# Patient Record
Sex: FEMALE | Race: WHITE | HISPANIC OR LATINO | Employment: UNEMPLOYED
[De-identification: names, ages, dates, MRNs, and addresses within clinical notes are randomized per-mention and may not be internally consistent; named-entity substitution may affect disease eponyms.]

---

## 2019-02-26 ENCOUNTER — HOSPITAL (OUTPATIENT)
Dept: OTHER | Age: 49
End: 2019-02-26
Attending: NURSE PRACTITIONER

## 2019-02-26 LAB
CONTROL LINE: PRESENT
Lab: CLEAR
UA APPEAR POC: CLEAR
UA BILI POC: NEGATIVE
UA BLOOD POC: ABNORMAL
UA COLOR POC: YELLOW
UA GLUCOSE POC: NEGATIVE
UA KETONES POC: NEGATIVE
UA LEUK EST POC: NEGATIVE
UA NITRITE POC: NEGATIVE
UA PH POC: 6 (ref 5–7)
UA PROTEIN POC: NEGATIVE
UA SPEC GRAV POC: 1.02 (ref 1.01–1.02)
UA UROBILIN POC: 0.2 MG/DL
URINE PREG POC: NEGATIVE

## 2019-04-06 ENCOUNTER — HOSPITAL (OUTPATIENT)
Dept: OTHER | Age: 49
End: 2019-04-06
Attending: PHYSICIAN ASSISTANT

## 2019-06-29 ENCOUNTER — HOSPITAL (OUTPATIENT)
Dept: OTHER | Age: 49
End: 2019-06-29
Attending: INTERNAL MEDICINE

## 2019-06-29 PROCEDURE — 93010 ELECTROCARDIOGRAM REPORT: CPT | Performed by: INTERNAL MEDICINE

## 2019-07-17 ENCOUNTER — HOSPITAL (OUTPATIENT)
Dept: OTHER | Age: 49
End: 2019-07-17
Attending: NURSE PRACTITIONER

## 2020-06-11 ENCOUNTER — HOSPITAL (OUTPATIENT)
Dept: OTHER | Age: 50
End: 2020-06-11
Attending: NURSE PRACTITIONER

## 2020-06-11 LAB
CONTROL LINE: PRESENT
Lab: CLEAR
UA APPEAR POC: ABNORMAL
UA APPEAR POC: ABNORMAL
UA BILI POC: NEGATIVE
UA BILI POC: NEGATIVE
UA BLOOD POC: ABNORMAL
UA BLOOD POC: ABNORMAL
UA COLOR POC: YELLOW
UA COLOR POC: YELLOW
UA GLUCOSE POC: NEGATIVE
UA GLUCOSE POC: NEGATIVE
UA KETONES POC: NEGATIVE
UA KETONES POC: NEGATIVE
UA LEUK EST POC: NEGATIVE
UA LEUK EST POC: NEGATIVE
UA NITRITE POC: NEGATIVE
UA NITRITE POC: NEGATIVE
UA PH POC: 6.5 (ref 5–7)
UA PH POC: 6.5 (ref 5–7)
UA PROTEIN POC: NEGATIVE
UA PROTEIN POC: NEGATIVE
UA SPEC GRAV POC: 1.01 (ref 1.01–1.02)
UA SPEC GRAV POC: 1.01 (ref 1.01–1.02)
UA UROBILIN POC: 0.2 MG/DL
UA UROBILIN POC: 0.2 MG/DL
URINE PREG POC: NEGATIVE
URINE PREG POC: NEGATIVE

## 2020-08-22 ENCOUNTER — HOSPITAL (OUTPATIENT)
Dept: OTHER | Age: 50
End: 2020-08-22
Attending: PHYSICIAN ASSISTANT

## 2020-08-22 LAB
A/G RATIO_: 1.2
A/G RATIO_: 1.2
ABS LYMPH: 1.6 K/CUMM (ref 1–3.5)
ABS LYMPH: 1.6 K/CUMM (ref 1–3.5)
ABS MONO: 0.7 K/CUMM (ref 0.1–0.8)
ABS MONO: 0.7 K/CUMM (ref 0.1–0.8)
ABS NEUTRO: 8.6 K/CUMM (ref 2–8)
ABS NEUTRO: 8.6 K/CUMM (ref 2–8)
ALBUMIN: 4.3 G/DL (ref 3.5–5)
ALBUMIN: 4.3 G/DL (ref 3.5–5)
ALK PHOS: 103 UNIT/L (ref 50–124)
ALK PHOS: 103 UNIT/L (ref 50–124)
ALT/GPT: 39 UNIT/L (ref 0–55)
ALT/GPT: 39 UNIT/L (ref 0–55)
ANION GAP SERPL CALC-SCNC: 17 MEQ/L (ref 10–20)
ANION GAP SERPL CALC-SCNC: 17 MEQ/L (ref 10–20)
AST/GOT: 44 UNIT/L (ref 5–34)
AST/GOT: 44 UNIT/L (ref 5–34)
BASOPHIL: 0 % (ref 0–1)
BASOPHIL: 0 % (ref 0–1)
BILI TOTAL: 0.2 MG/DL (ref 0.2–1)
BILI TOTAL: 0.2 MG/DL (ref 0.2–1)
BUN SERPL-MCNC: 10 MG/DL (ref 6–20)
BUN SERPL-MCNC: 10 MG/DL (ref 6–20)
CALCIUM: 9.5 MG/DL (ref 8.4–10.2)
CALCIUM: 9.5 MG/DL (ref 8.4–10.2)
CHLORIDE: 103 MEQ/L (ref 97–107)
CHLORIDE: 103 MEQ/L (ref 97–107)
CONTROL LINE: PRESENT
CONTROL LINE: PRESENT
CREATININE: 0.77 MG/DL (ref 0.6–1.3)
CREATININE: 0.77 MG/DL (ref 0.6–1.3)
DIFF_TYPE?: ABNORMAL
EOSINOPHIL: 1 % (ref 0–6)
EOSINOPHIL: 1 % (ref 0–6)
GLOBULIN_: 3.6 G/DL (ref 2–4.1)
GLOBULIN_: 3.6 G/DL (ref 2–4.1)
GLUCOSE LVL: 95 MG/DL (ref 70–99)
GLUCOSE LVL: 95 MG/DL (ref 70–99)
HCT VFR BLD CALC: 43 % (ref 33–45)
HCT VFR BLD CALC: 43 % (ref 33–45)
HEMOLYSIS 2+: NEGATIVE
HGB BLD-MCNC: 14.2 G/DL (ref 11–15)
HGB BLD-MCNC: 14.2 G/DL (ref 11–15)
ICTERIC 4+: NEGATIVE
IMMATURE GRAN: 0.4 % (ref 0–0.3)
IMMATURE GRAN: 0.4 % (ref 0–0.3)
INSTR WBC: 11.3 K/CUMM (ref 4–11)
LIPASE LEVEL: 368 UNIT/L (ref 8–78)
LIPASE LEVEL: 368 UNIT/L (ref 8–78)
LIPEMIC 3+: NEGATIVE
LYMPHOCYTE: 15 %
LYMPHOCYTE: 15 %
Lab: CLEAR
Lab: CLEAR
MCH RBC QN AUTO: 31 PG (ref 25–35)
MCH RBC QN AUTO: 31 PG (ref 25–35)
MCHC RBC AUTO-ENTMCNC: 33 G/DL (ref 32–37)
MCHC RBC AUTO-ENTMCNC: 33 G/DL (ref 32–37)
MCV RBC AUTO: 94 FL (ref 78–97)
MCV RBC AUTO: 94 FL (ref 78–97)
MONOCYTE: 6 %
MONOCYTE: 6 %
NEUTROPHIL: 77 %
NEUTROPHIL: 77 %
NRBC BLD MANUAL-RTO: 0 % (ref 0–0.2)
NRBC BLD MANUAL-RTO: 0 % (ref 0–0.2)
PLATELET: 302 K/CUMM (ref 150–450)
PLATELET: 302 K/CUMM (ref 150–450)
POTASSIUM: 4 MEQ/L (ref 3.5–5.1)
POTASSIUM: 4 MEQ/L (ref 3.5–5.1)
RBC # BLD: 4.63 M/CUMM (ref 3.7–5.2)
RBC # BLD: 4.63 M/CUMM (ref 3.7–5.2)
RDW: 12.1 % (ref 11.5–14.5)
RDW: 12.1 % (ref 11.5–14.5)
SODIUM: 139 MEQ/L (ref 136–145)
SODIUM: 139 MEQ/L (ref 136–145)
TCO2: 23 MEQ/L (ref 19–29)
TCO2: 23 MEQ/L (ref 19–29)
TOTAL PROTEIN: 7.9 G/DL (ref 6.4–8.3)
TOTAL PROTEIN: 7.9 G/DL (ref 6.4–8.3)
UA APPEAR POC: ABNORMAL
UA APPEAR POC: ABNORMAL
UA APPEAR: CLEAR
UA APPEAR: CLEAR
UA BACTERIA: ABNORMAL
UA BACTERIA: ABNORMAL
UA BILI POC: NEGATIVE
UA BILI POC: NEGATIVE
UA BILI: NEGATIVE
UA BILI: NEGATIVE
UA BLOOD POC: ABNORMAL
UA BLOOD POC: ABNORMAL
UA BLOOD: ABNORMAL
UA BLOOD: ABNORMAL
UA COLOR POC: YELLOW
UA COLOR POC: YELLOW
UA COLOR: YELLOW
UA COLOR: YELLOW
UA EPITHELIAL: ABNORMAL
UA EPITHELIAL: ABNORMAL
UA GLUCOSE POC: NEGATIVE
UA GLUCOSE POC: NEGATIVE
UA GLUCOSE: NEGATIVE
UA GLUCOSE: NEGATIVE
UA KETONES POC: NEGATIVE
UA KETONES POC: NEGATIVE
UA KETONES: NEGATIVE
UA KETONES: NEGATIVE
UA LEUK EST POC: ABNORMAL
UA LEUK EST POC: ABNORMAL
UA LEUK EST: ABNORMAL
UA LEUK EST: ABNORMAL
UA NITRITE POC: NEGATIVE
UA NITRITE POC: NEGATIVE
UA NITRITE: NEGATIVE
UA NITRITE: NEGATIVE
UA PH POC: 6 (ref 5–7)
UA PH POC: 6 (ref 5–7)
UA PH: 6.5 (ref 5–7)
UA PH: 6.5 (ref 5–7)
UA PROTEIN POC: NEGATIVE
UA PROTEIN POC: NEGATIVE
UA PROTEIN: NEGATIVE
UA PROTEIN: NEGATIVE
UA RBC: ABNORMAL
UA RBC: ABNORMAL
UA SPEC GRAV POC: 1.02 (ref 1.01–1.02)
UA SPEC GRAV POC: 1.02 (ref 1.01–1.02)
UA SPEC GRAV: <=1.005 (ref 1.01–1.02)
UA SPEC GRAV: <=1.005 (ref 1.01–1.02)
UA UROBILIN POC: 0.2 MG/DL
UA UROBILIN POC: 0.2 MG/DL
UA UROBILINOGEN: 0.2 MG/DL (ref 0.2–1)
UA UROBILINOGEN: 0.2 MG/DL (ref 0.2–1)
UA WBC: ABNORMAL
UA WBC: ABNORMAL
URINE PREG POC: NEGATIVE
WBC # BLD: 11.3 K/CUMM (ref 4–11)
WBC # BLD: 11.3 K/CUMM (ref 4–11)

## 2021-03-19 ENCOUNTER — WALK IN (OUTPATIENT)
Dept: URGENT CARE | Age: 51
End: 2021-03-19
Attending: FAMILY MEDICINE

## 2021-03-19 DIAGNOSIS — H92.01 OTALGIA OF RIGHT EAR: Primary | ICD-10-CM

## 2021-03-19 PROCEDURE — 99212 OFFICE O/P EST SF 10 MIN: CPT

## 2021-03-19 ASSESSMENT — ENCOUNTER SYMPTOMS
CONSTITUTIONAL NEGATIVE: 1
EYE ITCHING: 1
NEUROLOGICAL NEGATIVE: 1
PSYCHIATRIC NEGATIVE: 1
RHINORRHEA: 0
ENDOCRINE NEGATIVE: 1
GASTROINTESTINAL NEGATIVE: 1
RESPIRATORY NEGATIVE: 1
FEVER: 0
HEMATOLOGIC/LYMPHATIC NEGATIVE: 1
ALLERGIC/IMMUNOLOGIC NEGATIVE: 1

## 2021-03-19 ASSESSMENT — PAIN SCALES - GENERAL: PAINLEVEL: 1-2

## 2021-05-26 VITALS
SYSTOLIC BLOOD PRESSURE: 135 MMHG | TEMPERATURE: 97.3 F | DIASTOLIC BLOOD PRESSURE: 86 MMHG | OXYGEN SATURATION: 100 % | HEART RATE: 102 BPM | HEIGHT: 63 IN | BODY MASS INDEX: 30.12 KG/M2 | WEIGHT: 170 LBS | RESPIRATION RATE: 20 BRPM

## 2021-07-05 ENCOUNTER — WALK IN (OUTPATIENT)
Dept: URGENT CARE | Age: 51
End: 2021-07-05
Attending: EMERGENCY MEDICINE

## 2021-07-05 VITALS
RESPIRATION RATE: 18 BRPM | SYSTOLIC BLOOD PRESSURE: 153 MMHG | OXYGEN SATURATION: 99 % | DIASTOLIC BLOOD PRESSURE: 111 MMHG | HEART RATE: 129 BPM | TEMPERATURE: 98.9 F

## 2021-07-05 DIAGNOSIS — R09.81 SINUS CONGESTION: Primary | ICD-10-CM

## 2021-07-05 DIAGNOSIS — R30.0 DYSURIA: ICD-10-CM

## 2021-07-05 LAB
APPEARANCE, POC: CLEAR
BILIRUBIN, POC: NEGATIVE
COLOR, POC: YELLOW
GLUCOSE UR-MCNC: NEGATIVE MG/DL
KETONES, POC: NEGATIVE MG/DL
NITRITE, POC: NEGATIVE
OCCULT BLOOD, POC: ABNORMAL
PH UR: 6 [PH] (ref 5–7)
PROT UR-MCNC: NEGATIVE MG/DL
SP GR UR: 1.03 (ref 1–1.03)
UROBILINOGEN UR-MCNC: 0.2 MG/DL (ref 0–1)
WBC (LEUKOCYTE) ESTERASE, POC: NEGATIVE

## 2021-07-05 PROCEDURE — 99212 OFFICE O/P EST SF 10 MIN: CPT

## 2021-07-05 PROCEDURE — 81003 URINALYSIS AUTO W/O SCOPE: CPT | Performed by: NURSE PRACTITIONER

## 2021-07-05 PROCEDURE — 87086 URINE CULTURE/COLONY COUNT: CPT | Performed by: NURSE PRACTITIONER

## 2021-07-05 RX ORDER — DOXYCYCLINE HYCLATE 100 MG
100 TABLET ORAL 2 TIMES DAILY
Qty: 20 TABLET | Refills: 0 | Status: SHIPPED | OUTPATIENT
Start: 2021-07-05 | End: 2021-07-15

## 2021-07-05 ASSESSMENT — ENCOUNTER SYMPTOMS
CONFUSION: 0
LIGHT-HEADEDNESS: 0
RHINORRHEA: 1
BRUISES/BLEEDS EASILY: 0
HEADACHES: 0
WEAKNESS: 0
SHORTNESS OF BREATH: 0
FACIAL SWELLING: 0
ABDOMINAL PAIN: 0
SORE THROAT: 0
PHOTOPHOBIA: 0
WOUND: 0
DIARRHEA: 0
ACTIVITY CHANGE: 0
CONSTIPATION: 0
SINUS PAIN: 1
NUMBNESS: 0
DIZZINESS: 0
CHILLS: 0
TROUBLE SWALLOWING: 0
BACK PAIN: 0
FEVER: 0
APPETITE CHANGE: 0
WHEEZING: 0
COLOR CHANGE: 0
NAUSEA: 0
VOMITING: 0
COUGH: 0
CHEST TIGHTNESS: 0
SINUS PRESSURE: 1

## 2021-07-05 ASSESSMENT — VISUAL ACUITY: OU: 1

## 2021-07-05 ASSESSMENT — PAIN SCALES - GENERAL: PAINLEVEL: 4

## 2021-07-07 LAB — BACTERIA UR CULT: NORMAL

## 2021-10-12 ENCOUNTER — WALK IN (OUTPATIENT)
Dept: URGENT CARE | Age: 51
End: 2021-10-12
Attending: EMERGENCY MEDICINE

## 2021-10-12 ENCOUNTER — HOSPITAL ENCOUNTER (OUTPATIENT)
Dept: GENERAL RADIOLOGY | Age: 51
Discharge: HOME | End: 2021-10-12
Attending: EMERGENCY MEDICINE

## 2021-10-12 VITALS
HEART RATE: 98 BPM | DIASTOLIC BLOOD PRESSURE: 98 MMHG | HEIGHT: 63 IN | TEMPERATURE: 98.4 F | RESPIRATION RATE: 18 BRPM | SYSTOLIC BLOOD PRESSURE: 144 MMHG | WEIGHT: 170 LBS | OXYGEN SATURATION: 100 % | BODY MASS INDEX: 30.12 KG/M2

## 2021-10-12 DIAGNOSIS — M79.672 LEFT FOOT PAIN: ICD-10-CM

## 2021-10-12 DIAGNOSIS — M79.672 LEFT FOOT PAIN: Primary | ICD-10-CM

## 2021-10-12 DIAGNOSIS — M54.32 SCIATICA OF LEFT SIDE: ICD-10-CM

## 2021-10-12 PROCEDURE — 73630 X-RAY EXAM OF FOOT: CPT

## 2021-10-12 PROCEDURE — 99213 OFFICE O/P EST LOW 20 MIN: CPT

## 2021-10-12 RX ORDER — ACETAMINOPHEN AND CODEINE PHOSPHATE 300; 30 MG/1; MG/1
1 TABLET ORAL
COMMUNITY
Start: 2021-07-13

## 2021-10-12 RX ORDER — METHYLPREDNISOLONE 4 MG/1
4 TABLET ORAL SEE ADMIN INSTRUCTIONS
Qty: 21 TABLET | Refills: 0 | Status: SHIPPED | OUTPATIENT
Start: 2021-10-12 | End: 2021-10-19

## 2021-10-12 RX ORDER — ALBUTEROL SULFATE 90 UG/1
1-2 AEROSOL, METERED RESPIRATORY (INHALATION)
COMMUNITY
Start: 2021-06-14

## 2021-10-12 RX ORDER — LEVOTHYROXINE SODIUM 0.1 MG/1
TABLET ORAL
COMMUNITY
Start: 2021-10-10

## 2021-10-12 RX ORDER — PREGABALIN 75 MG/1
CAPSULE ORAL
COMMUNITY

## 2021-10-12 RX ORDER — TRIAMCINOLONE ACETONIDE 0.25 MG/G
CREAM TOPICAL
COMMUNITY

## 2021-10-12 RX ORDER — FAMOTIDINE 20 MG/1
TABLET, FILM COATED ORAL
COMMUNITY

## 2021-10-12 RX ORDER — ALPRAZOLAM 0.5 MG/1
0.5 TABLET ORAL
COMMUNITY
Start: 2021-07-13

## 2021-10-12 ASSESSMENT — ENCOUNTER SYMPTOMS
NUMBNESS: 1
NAUSEA: 0
FEVER: 0
HEADACHES: 0
ABDOMINAL PAIN: 0
SHORTNESS OF BREATH: 0
CONFUSION: 0
BACK PAIN: 0
CHILLS: 0
VOMITING: 0
COUGH: 0
SINUS PAIN: 0

## 2021-10-12 ASSESSMENT — PAIN SCALES - GENERAL: PAINLEVEL: 6

## 2022-04-20 ENCOUNTER — WALK IN (OUTPATIENT)
Dept: URGENT CARE | Age: 52
End: 2022-04-20
Attending: FAMILY MEDICINE

## 2022-04-20 VITALS
SYSTOLIC BLOOD PRESSURE: 142 MMHG | RESPIRATION RATE: 20 BRPM | HEIGHT: 63 IN | TEMPERATURE: 97.6 F | BODY MASS INDEX: 28.35 KG/M2 | HEART RATE: 96 BPM | OXYGEN SATURATION: 100 % | DIASTOLIC BLOOD PRESSURE: 92 MMHG | WEIGHT: 160 LBS

## 2022-04-20 DIAGNOSIS — R51.9 SINUS HEADACHE: Primary | ICD-10-CM

## 2022-04-20 PROCEDURE — 99212 OFFICE O/P EST SF 10 MIN: CPT

## 2022-04-20 ASSESSMENT — PAIN SCALES - GENERAL: PAINLEVEL: 6

## 2022-04-20 ASSESSMENT — ENCOUNTER SYMPTOMS
SINUS PRESSURE: 1
GASTROINTESTINAL NEGATIVE: 1
CONSTITUTIONAL NEGATIVE: 1
PSYCHIATRIC NEGATIVE: 1
NEUROLOGICAL NEGATIVE: 1
ALLERGIC/IMMUNOLOGIC NEGATIVE: 1
HEMATOLOGIC/LYMPHATIC NEGATIVE: 1
SINUS PAIN: 1
COUGH: 1
ENDOCRINE NEGATIVE: 1
EYES NEGATIVE: 1

## 2022-05-12 ENCOUNTER — WALK IN (OUTPATIENT)
Dept: URGENT CARE | Age: 52
End: 2022-05-12
Attending: EMERGENCY MEDICINE

## 2022-05-12 VITALS
SYSTOLIC BLOOD PRESSURE: 153 MMHG | DIASTOLIC BLOOD PRESSURE: 99 MMHG | HEART RATE: 91 BPM | TEMPERATURE: 98.1 F | OXYGEN SATURATION: 100 % | RESPIRATION RATE: 16 BRPM

## 2022-05-12 DIAGNOSIS — R10.30 LOWER ABDOMINAL PAIN: Primary | ICD-10-CM

## 2022-05-12 LAB
APPEARANCE, POC: CLEAR
B-HCG UR QL: NEGATIVE
BILIRUBIN, POC: NEGATIVE
COLOR, POC: YELLOW
GLUCOSE UR-MCNC: NEGATIVE MG/DL
INTERNAL PROCEDURAL CONTROLS ACCEPTABLE: YES
KETONES, POC: NEGATIVE MG/DL
NITRITE, POC: NEGATIVE
OCCULT BLOOD, POC: ABNORMAL
PH UR: 7 [PH] (ref 5–7)
PROT UR-MCNC: NEGATIVE MG/DL
SP GR UR: 1.02 (ref 1–1.03)
UROBILINOGEN UR-MCNC: 0.2 MG/DL (ref 0–1)
WBC (LEUKOCYTE) ESTERASE, POC: NEGATIVE

## 2022-05-12 PROCEDURE — 81025 URINE PREGNANCY TEST: CPT | Performed by: NURSE PRACTITIONER

## 2022-05-12 PROCEDURE — 87481 CANDIDA DNA AMP PROBE: CPT | Performed by: NURSE PRACTITIONER

## 2022-05-12 PROCEDURE — 99212 OFFICE O/P EST SF 10 MIN: CPT

## 2022-05-12 PROCEDURE — 87086 URINE CULTURE/COLONY COUNT: CPT | Performed by: NURSE PRACTITIONER

## 2022-05-12 PROCEDURE — 81003 URINALYSIS AUTO W/O SCOPE: CPT | Performed by: NURSE PRACTITIONER

## 2022-05-12 RX ORDER — CEPHALEXIN 500 MG/1
500 CAPSULE ORAL
COMMUNITY
Start: 2022-05-12

## 2022-05-12 ASSESSMENT — ENCOUNTER SYMPTOMS
CONSTITUTIONAL NEGATIVE: 1
ENDOCRINE NEGATIVE: 1
ALLERGIC/IMMUNOLOGIC NEGATIVE: 1
PSYCHIATRIC NEGATIVE: 1
GASTROINTESTINAL NEGATIVE: 1
RESPIRATORY NEGATIVE: 1
EYES NEGATIVE: 1
NEUROLOGICAL NEGATIVE: 1
HEMATOLOGIC/LYMPHATIC NEGATIVE: 1

## 2022-05-12 ASSESSMENT — PAIN SCALES - GENERAL: PAINLEVEL: 6

## 2022-05-14 LAB — BACTERIA UR CULT: NORMAL

## 2022-05-16 LAB
BACTERIAL VAGINOSIS VAG-IMP: NOT DETECTED
C ALBICANS DNA VAG QL NAA+PROBE: NOT DETECTED
C GLABRATA DNA VAG QL NAA+PROBE: NOT DETECTED
M GENITALIUM DNA SPEC QL NAA+PROBE: NOT DETECTED
SERVICE CMNT-IMP: NORMAL
T VAGINALIS DNA VAG QL NAA+PROBE: NOT DETECTED

## 2022-07-29 ENCOUNTER — WALK IN (OUTPATIENT)
Dept: URGENT CARE | Age: 52
End: 2022-07-29
Attending: FAMILY MEDICINE

## 2022-07-29 VITALS
SYSTOLIC BLOOD PRESSURE: 148 MMHG | OXYGEN SATURATION: 98 % | RESPIRATION RATE: 18 BRPM | TEMPERATURE: 98.2 F | DIASTOLIC BLOOD PRESSURE: 63 MMHG | HEART RATE: 100 BPM

## 2022-07-29 DIAGNOSIS — R10.30 LOWER ABDOMINAL PAIN: Primary | ICD-10-CM

## 2022-07-29 DIAGNOSIS — R35.0 FREQUENCY OF URINATION: ICD-10-CM

## 2022-07-29 LAB
APPEARANCE, POC: CLEAR
B-HCG UR QL: NEGATIVE
BILIRUBIN, POC: NEGATIVE
COLOR, POC: YELLOW
GLUCOSE UR-MCNC: NEGATIVE MG/DL
INTERNAL PROCEDURAL CONTROLS ACCEPTABLE: YES
KETONES, POC: NEGATIVE MG/DL
NITRITE, POC: NEGATIVE
OCCULT BLOOD, POC: ABNORMAL
PH UR: 6 [PH] (ref 5–7)
PROT UR-MCNC: NEGATIVE MG/DL
SP GR UR: 1.01 (ref 1–1.03)
UROBILINOGEN UR-MCNC: 0.2 MG/DL (ref 0–1)
WBC (LEUKOCYTE) ESTERASE, POC: NEGATIVE

## 2022-07-29 PROCEDURE — 87086 URINE CULTURE/COLONY COUNT: CPT | Performed by: NURSE PRACTITIONER

## 2022-07-29 PROCEDURE — 81025 URINE PREGNANCY TEST: CPT | Performed by: NURSE PRACTITIONER

## 2022-07-29 PROCEDURE — 99212 OFFICE O/P EST SF 10 MIN: CPT

## 2022-07-29 PROCEDURE — 81003 URINALYSIS AUTO W/O SCOPE: CPT | Performed by: NURSE PRACTITIONER

## 2022-07-29 ASSESSMENT — ENCOUNTER SYMPTOMS
HEMATOLOGIC/LYMPHATIC NEGATIVE: 1
PSYCHIATRIC NEGATIVE: 1
ABDOMINAL PAIN: 1
EYES NEGATIVE: 1
NEUROLOGICAL NEGATIVE: 1
ENDOCRINE NEGATIVE: 1
CONSTITUTIONAL NEGATIVE: 1
RESPIRATORY NEGATIVE: 1
ALLERGIC/IMMUNOLOGIC NEGATIVE: 1

## 2022-07-29 ASSESSMENT — PAIN SCALES - GENERAL: PAINLEVEL: 5

## 2022-07-31 LAB — BACTERIA UR CULT: NORMAL

## 2022-09-29 ENCOUNTER — APPOINTMENT (OUTPATIENT)
Dept: URBAN - METROPOLITAN AREA CLINIC 246 | Age: 52
Setting detail: DERMATOLOGY
End: 2022-09-29

## 2022-09-29 DIAGNOSIS — L71.8 OTHER ROSACEA: ICD-10-CM

## 2022-09-29 DIAGNOSIS — L81.4 OTHER MELANIN HYPERPIGMENTATION: ICD-10-CM

## 2022-09-29 PROCEDURE — 99203 OFFICE O/P NEW LOW 30 MIN: CPT

## 2022-09-29 PROCEDURE — OTHER COUNSELING: OTHER

## 2022-09-29 PROCEDURE — OTHER ADDITIONAL NOTES: OTHER

## 2022-09-29 PROCEDURE — OTHER PRESCRIPTION: OTHER

## 2022-09-29 RX ORDER — METRONIDAZOLE 7.5 MG/G
CREAM TOPICAL
Qty: 45 | Refills: 2 | Status: ERX

## 2022-09-29 RX ORDER — METRONIDAZOLE 7.5 MG/G
CREAM TOPICAL
Qty: 45 | Refills: 2 | Status: ERX | COMMUNITY
Start: 2022-09-29

## 2022-09-29 ASSESSMENT — LOCATION ZONE DERM
LOCATION ZONE: FACE
LOCATION ZONE: FACE

## 2022-09-29 ASSESSMENT — LOCATION SIMPLE DESCRIPTION DERM
LOCATION SIMPLE: RIGHT CHEEK
LOCATION SIMPLE: LEFT CHEEK
LOCATION SIMPLE: RIGHT CHEEK
LOCATION SIMPLE: LEFT CHEEK

## 2022-09-29 ASSESSMENT — LOCATION DETAILED DESCRIPTION DERM
LOCATION DETAILED: RIGHT INFERIOR CENTRAL MALAR CHEEK
LOCATION DETAILED: LEFT CENTRAL MALAR CHEEK
LOCATION DETAILED: LEFT CENTRAL MALAR CHEEK
LOCATION DETAILED: RIGHT INFERIOR MEDIAL MALAR CHEEK

## 2022-09-29 NOTE — PROCEDURE: ADDITIONAL NOTES
Additional Notes: Patient given doxycycline from another provider.
Render Risk Assessment In Note?: no
Additional Notes: Recommend facial or laser treatment if patient desires in the future to lighten, but not completely remove, the area.
Detail Level: Simple

## 2022-10-25 ENCOUNTER — WALK IN (OUTPATIENT)
Dept: URGENT CARE | Age: 52
End: 2022-10-25
Attending: EMERGENCY MEDICINE

## 2022-10-25 VITALS
SYSTOLIC BLOOD PRESSURE: 169 MMHG | TEMPERATURE: 97.7 F | OXYGEN SATURATION: 100 % | RESPIRATION RATE: 18 BRPM | HEART RATE: 96 BPM | DIASTOLIC BLOOD PRESSURE: 111 MMHG

## 2022-10-25 DIAGNOSIS — M43.6 TORTICOLLIS: ICD-10-CM

## 2022-10-25 DIAGNOSIS — M79.602 LEFT ARM PAIN: Primary | ICD-10-CM

## 2022-10-25 PROCEDURE — 93005 ELECTROCARDIOGRAM TRACING: CPT | Performed by: EMERGENCY MEDICINE

## 2022-10-25 PROCEDURE — 99213 OFFICE O/P EST LOW 20 MIN: CPT

## 2022-10-25 RX ORDER — NAPROXEN 500 MG/1
500 TABLET ORAL 2 TIMES DAILY WITH MEALS
Qty: 20 TABLET | Refills: 0 | Status: SHIPPED | OUTPATIENT
Start: 2022-10-25

## 2022-10-25 RX ORDER — CYCLOBENZAPRINE HCL 5 MG
5 TABLET ORAL 3 TIMES DAILY PRN
Qty: 15 TABLET | Refills: 0 | Status: SHIPPED | OUTPATIENT
Start: 2022-10-25

## 2022-10-25 ASSESSMENT — ENCOUNTER SYMPTOMS
PSYCHIATRIC NEGATIVE: 1
NEUROLOGICAL NEGATIVE: 1
EYES NEGATIVE: 1
ENDOCRINE NEGATIVE: 1
HEMATOLOGIC/LYMPHATIC NEGATIVE: 1
GASTROINTESTINAL NEGATIVE: 1
RESPIRATORY NEGATIVE: 1
CONSTITUTIONAL NEGATIVE: 1
ALLERGIC/IMMUNOLOGIC NEGATIVE: 1

## 2022-10-26 LAB
ATRIAL RATE (BPM): 91
P AXIS (DEGREES): 75
PR-INTERVAL (MSEC): 184
QRS-INTERVAL (MSEC): 76
QT-INTERVAL (MSEC): 350
QTC: 431
R AXIS (DEGREES): 6
REPORT TEXT: NORMAL
T AXIS (DEGREES): 2
VENTRICULAR RATE EKG/MIN (BPM): 91

## 2023-02-16 ENCOUNTER — RX ONLY (RX ONLY)
Age: 53
End: 2023-02-16

## 2023-02-16 RX ORDER — METRONIDAZOLE 7.5 MG/G
CREAM TOPICAL
Qty: 45 | Refills: 2 | Status: ERX

## 2023-02-16 RX ORDER — METRONIDAZOLE 7.5 MG/G
GEL TOPICAL
Qty: 45 | Refills: 1 | Status: ERX | COMMUNITY
Start: 2023-02-16

## 2023-11-29 ENCOUNTER — WALK IN (OUTPATIENT)
Dept: URGENT CARE | Age: 53
End: 2023-11-29
Attending: FAMILY MEDICINE

## 2023-11-29 VITALS
WEIGHT: 190 LBS | SYSTOLIC BLOOD PRESSURE: 156 MMHG | HEART RATE: 96 BPM | DIASTOLIC BLOOD PRESSURE: 89 MMHG | OXYGEN SATURATION: 100 % | RESPIRATION RATE: 18 BRPM | HEIGHT: 63 IN | BODY MASS INDEX: 33.66 KG/M2 | TEMPERATURE: 97.7 F

## 2023-11-29 DIAGNOSIS — R30.0 DYSURIA: ICD-10-CM

## 2023-11-29 DIAGNOSIS — R10.2 PELVIC PAIN: Primary | ICD-10-CM

## 2023-11-29 LAB
APPEARANCE, POC: CLEAR
BILIRUBIN, POC: NEGATIVE
COLOR, POC: YELLOW
GLUCOSE UR-MCNC: NEGATIVE MG/DL
KETONES, POC: NEGATIVE MG/DL
NITRITE, POC: NEGATIVE
OCCULT BLOOD, POC: ABNORMAL
PH UR: 6 [PH] (ref 5–7)
PROT UR-MCNC: NEGATIVE MG/DL
SP GR UR: 1.02 (ref 1–1.03)
UROBILINOGEN UR-MCNC: 0.2 MG/DL (ref 0–1)
WBC (LEUKOCYTE) ESTERASE, POC: NEGATIVE

## 2023-11-29 PROCEDURE — 81003 URINALYSIS AUTO W/O SCOPE: CPT | Performed by: NURSE PRACTITIONER

## 2023-11-29 PROCEDURE — 87086 URINE CULTURE/COLONY COUNT: CPT | Performed by: NURSE PRACTITIONER

## 2023-11-29 PROCEDURE — 99212 OFFICE O/P EST SF 10 MIN: CPT

## 2023-11-29 ASSESSMENT — ENCOUNTER SYMPTOMS
RESPIRATORY NEGATIVE: 1
HEADACHES: 0
DIZZINESS: 0
RHINORRHEA: 0
PSYCHIATRIC NEGATIVE: 1
ALLERGIC/IMMUNOLOGIC NEGATIVE: 1
SINUS PAIN: 0
EYES NEGATIVE: 1
HEMATOLOGIC/LYMPHATIC NEGATIVE: 1
CONSTITUTIONAL NEGATIVE: 1
ENDOCRINE NEGATIVE: 1
NEUROLOGICAL NEGATIVE: 1
SORE THROAT: 0
GASTROINTESTINAL NEGATIVE: 1
SINUS PRESSURE: 0

## 2023-12-01 LAB — BACTERIA UR CULT: NORMAL

## 2024-09-03 ENCOUNTER — WALK IN (OUTPATIENT)
Dept: URGENT CARE | Age: 54
End: 2024-09-03
Attending: FAMILY MEDICINE

## 2024-09-03 VITALS
SYSTOLIC BLOOD PRESSURE: 157 MMHG | TEMPERATURE: 97.8 F | DIASTOLIC BLOOD PRESSURE: 96 MMHG | OXYGEN SATURATION: 99 % | HEART RATE: 83 BPM | RESPIRATION RATE: 18 BRPM

## 2024-09-03 DIAGNOSIS — R30.0 DYSURIA: Primary | ICD-10-CM

## 2024-09-03 LAB
APPEARANCE, POC: CLEAR
BILIRUBIN, POC: NEGATIVE
COLOR, POC: YELLOW
GLUCOSE UR-MCNC: NEGATIVE MG/DL
KETONES, POC: NEGATIVE MG/DL
NITRITE, POC: NEGATIVE
OCCULT BLOOD, POC: ABNORMAL
PH UR: 7 [PH] (ref 5–7)
PROT UR-MCNC: NEGATIVE MG/DL
SP GR UR: 1.02 (ref 1–1.03)
UROBILINOGEN UR-MCNC: 0.2 MG/DL (ref 0–1)
WBC (LEUKOCYTE) ESTERASE, POC: NEGATIVE

## 2024-09-03 PROCEDURE — 81003 URINALYSIS AUTO W/O SCOPE: CPT | Performed by: NURSE PRACTITIONER

## 2024-09-03 PROCEDURE — 87086 URINE CULTURE/COLONY COUNT: CPT | Performed by: NURSE PRACTITIONER

## 2024-09-03 RX ORDER — CEPHALEXIN 500 MG/1
500 CAPSULE ORAL 2 TIMES DAILY
Qty: 14 CAPSULE | Refills: 0 | Status: SHIPPED | OUTPATIENT
Start: 2024-09-03 | End: 2024-09-10

## 2024-09-03 ASSESSMENT — ENCOUNTER SYMPTOMS
ALLERGIC/IMMUNOLOGIC NEGATIVE: 1
PSYCHIATRIC NEGATIVE: 1
SINUS PAIN: 0
SORE THROAT: 0
RHINORRHEA: 0
EYES NEGATIVE: 1
HEMATOLOGIC/LYMPHATIC NEGATIVE: 1
GASTROINTESTINAL NEGATIVE: 1
SINUS PRESSURE: 0
CONSTITUTIONAL NEGATIVE: 1
RESPIRATORY NEGATIVE: 1
ENDOCRINE NEGATIVE: 1
HEADACHES: 0
NEUROLOGICAL NEGATIVE: 1
DIZZINESS: 0

## 2024-09-04 LAB — BACTERIA UR CULT: NO GROWTH

## 2025-01-26 ENCOUNTER — WALK IN (OUTPATIENT)
Dept: URGENT CARE | Age: 55
End: 2025-01-26
Attending: EMERGENCY MEDICINE

## 2025-01-26 VITALS
HEART RATE: 82 BPM | TEMPERATURE: 98.4 F | OXYGEN SATURATION: 99 % | SYSTOLIC BLOOD PRESSURE: 136 MMHG | DIASTOLIC BLOOD PRESSURE: 85 MMHG | RESPIRATION RATE: 16 BRPM

## 2025-01-26 DIAGNOSIS — Z20.828 EXPOSURE TO INFLUENZA: Primary | ICD-10-CM

## 2025-01-26 RX ORDER — OSELTAMIVIR PHOSPHATE 75 MG/1
75 CAPSULE ORAL DAILY
Qty: 10 CAPSULE | Refills: 0 | Status: SHIPPED | OUTPATIENT
Start: 2025-01-26 | End: 2025-02-05